# Patient Record
Sex: MALE | Race: BLACK OR AFRICAN AMERICAN | Employment: FULL TIME
[De-identification: names, ages, dates, MRNs, and addresses within clinical notes are randomized per-mention and may not be internally consistent; named-entity substitution may affect disease eponyms.]

---

## 2024-04-15 ENCOUNTER — HOSPITAL ENCOUNTER (EMERGENCY)
Facility: HOSPITAL | Age: 29
Discharge: HOME | End: 2024-04-15
Attending: EMERGENCY MEDICINE

## 2024-04-15 VITALS
HEIGHT: 74 IN | HEART RATE: 74 BPM | BODY MASS INDEX: 26.31 KG/M2 | SYSTOLIC BLOOD PRESSURE: 143 MMHG | WEIGHT: 205 LBS | OXYGEN SATURATION: 100 % | DIASTOLIC BLOOD PRESSURE: 85 MMHG | TEMPERATURE: 98.3 F | RESPIRATION RATE: 20 BRPM

## 2024-04-15 DIAGNOSIS — G51.0 BELL'S PALSY: Primary | ICD-10-CM

## 2024-04-15 LAB — GLUCOSE BLD STRIP.AUTO-MCNC: 76 MG/DL (ref 70–110)

## 2024-04-15 PROCEDURE — 82962 GLUCOSE BLOOD TEST: CPT

## 2024-04-15 PROCEDURE — 6370000000 HC RX 637 (ALT 250 FOR IP): Performed by: EMERGENCY MEDICINE

## 2024-04-15 PROCEDURE — 99283 EMERGENCY DEPT VISIT LOW MDM: CPT

## 2024-04-15 RX ORDER — PREDNISONE 20 MG/1
60 TABLET ORAL ONCE
Status: COMPLETED | OUTPATIENT
Start: 2024-04-15 | End: 2024-04-15

## 2024-04-15 RX ORDER — VALACYCLOVIR HYDROCHLORIDE 1 G/1
1000 TABLET, FILM COATED ORAL 3 TIMES DAILY
Qty: 21 TABLET | Refills: 0 | Status: SHIPPED | OUTPATIENT
Start: 2024-04-15 | End: 2024-04-22

## 2024-04-15 RX ORDER — PREDNISONE 20 MG/1
60 TABLET ORAL DAILY
Qty: 18 TABLET | Refills: 0 | Status: SHIPPED | OUTPATIENT
Start: 2024-04-15 | End: 2024-04-21

## 2024-04-15 RX ADMIN — PREDNISONE 60 MG: 20 TABLET ORAL at 13:35

## 2024-04-15 ASSESSMENT — PAIN DESCRIPTION - LOCATION: LOCATION: TEETH

## 2024-04-15 ASSESSMENT — PAIN - FUNCTIONAL ASSESSMENT: PAIN_FUNCTIONAL_ASSESSMENT: 0-10

## 2024-04-15 ASSESSMENT — PAIN SCALES - GENERAL: PAINLEVEL_OUTOF10: 5

## 2024-04-15 NOTE — ED TRIAGE NOTES
Patient presents to ER ambulatory with c/o left sided facial \"palsy\" since Sunday. Patient states that on Sunday while brushing his teeth  while gargling he could not keep the liquids in mouth. Patient states that he assumed it was related to his tooth.  When he arrived to Dentist today they told him he need to go to ER immediately.   
History of a stroke or TIA

## 2024-04-15 NOTE — ED PROVIDER NOTES
[facility] EMERGENCY DEPT  eMERGENCY dEPARTMENT eNCOUnter        Pt Name: Darrion Nath  MRN: [id number]  Birthdate 1995  Date of evaluation: 4/15/2024  Provider: Pedro Strauss MD  PCP: No primary care provider on file.  Note Started: 1:18 PM EDT 4/15/2024          CHIEF COMPLAINT       Chief Complaint   Patient presents with    left facial numbness       HISTORY OF PRESENT ILLNESS        Patient coming in with 3 days of left-sided facial weakness.  Some drainage from his eye on the left.  Unable to close the eye tightly.  Being followed by his dentist for some dental caries in the left maxillary molars.  They want to do a root canal.  He is getting ready to reenter the  sometime in the next month or 2.  No ear pain.  No hearing difficulties.  No trauma.  No recent upper respiratory symptoms/viral symptoms.    Nursing Notes were all reviewed and agreed with or any disagreements were addressed  in the HPI.    REVIEW OF SYSTEMS         The following 10 systems are reviewed and negative except as noted in the HPI: Constitutional, Eyes, ENT, cardiovascular, pulmonary, GI, , neuro, skin, and musculoskeletal       PASTMEDICAL HISTORY   No past medical history on file.      SURGICAL HISTORY     No past surgical history on file.      CURRENT MEDICATIONS       Previous Medications    No medications on file       ALLERGIES     Patient has no known allergies.    FAMILY HISTORY     No family history on file.       SOCIAL HISTORY       Social History     Socioeconomic History    Marital status:        SCREENINGS   NIH Stroke Scale  Interval: Baseline  Level of Consciousness (1a): Alert  LOC Questions (1b): Answers both correctly  LOC Commands (1c): Performs both tasks correctly  Best Gaze (2): Normal  Visual (3): No visual loss  Facial Palsy (4): (!) Complete paralysis of one or both sides  Motor Arm, Left (5a): No drift  Motor Arm, Right (5b): No drift  Motor Leg, Left (6a): No drift  Motor Leg, Right

## 2024-04-15 NOTE — DISCHARGE INSTRUCTIONS
Take the prednisone as directed, your next dose tomorrow.  Take the Valtrex as directed, start that medication as soon as possible.  Eye care is very important as we discussed.  I recommend the liberal use of artificial tears to the left eye.  At night, apply a thin ribbon of the Lacri-Lube ointment to the lower lid of the left eye, available over-the-counter, close the eye and then tape the eye shut with paper tape.  Please call to arrange follow-up with the [facility].  You can also follow-up with your primary care provider.  I also recommend that you update the  regarding your new diagnosis of Bell's palsy.  Return as needed